# Patient Record
Sex: MALE | HISPANIC OR LATINO | Employment: FULL TIME | URBAN - NONMETROPOLITAN AREA
[De-identification: names, ages, dates, MRNs, and addresses within clinical notes are randomized per-mention and may not be internally consistent; named-entity substitution may affect disease eponyms.]

---

## 2024-09-27 ENCOUNTER — OFFICE VISIT (OUTPATIENT)
Dept: SURGERY | Facility: CLINIC | Age: 29
End: 2024-09-27

## 2024-09-27 VITALS
HEART RATE: 61 BPM | DIASTOLIC BLOOD PRESSURE: 87 MMHG | SYSTOLIC BLOOD PRESSURE: 135 MMHG | BODY MASS INDEX: 27.75 KG/M2 | WEIGHT: 147 LBS | HEIGHT: 61 IN | OXYGEN SATURATION: 97 %

## 2024-09-27 DIAGNOSIS — L03.317 CELLULITIS OF BUTTOCK: ICD-10-CM

## 2024-09-27 DIAGNOSIS — L02.31 ABSCESS, GLUTEAL, LEFT: Primary | ICD-10-CM

## 2024-09-27 RX ORDER — SULFAMETHOXAZOLE/TRIMETHOPRIM 800-160 MG
1 TABLET ORAL EVERY 12 HOURS SCHEDULED
COMMUNITY
Start: 2024-09-23

## 2024-10-11 ENCOUNTER — OFFICE VISIT (OUTPATIENT)
Dept: SURGERY | Facility: CLINIC | Age: 29
End: 2024-10-11
Payer: MEDICAID

## 2024-10-11 VITALS
SYSTOLIC BLOOD PRESSURE: 134 MMHG | WEIGHT: 147 LBS | OXYGEN SATURATION: 98 % | DIASTOLIC BLOOD PRESSURE: 83 MMHG | HEIGHT: 61 IN | HEART RATE: 56 BPM | BODY MASS INDEX: 27.75 KG/M2

## 2024-10-11 DIAGNOSIS — L02.31 ABSCESS, GLUTEAL, LEFT: Primary | ICD-10-CM

## 2024-10-11 NOTE — PATIENT INSTRUCTIONS
General Surgery Clinic Discharge Instructions      [facility]  10/11/24      Diagnosis: left gluteal abscess - incised and drained by Dr. Rajput; concerns for hidradenitis suppurativa, given evidence of multiple pits/tracts/scars across the left buttock    Medications/Treatments:   (Take all medications as prescribed by your provider)  None  If recurs, recommend seeing dermatology for potential management of hidradenitis suppurativa      Follow-up: No follow-up with my office needed. You should see a primary care provider to establish care in the near future.       Please call our office at [phone] with any issues, questions, or concerns.        Guillermo Rajput MD  10/11/24